# Patient Record
Sex: FEMALE | Race: WHITE | Employment: FULL TIME | URBAN - METROPOLITAN AREA
[De-identification: names, ages, dates, MRNs, and addresses within clinical notes are randomized per-mention and may not be internally consistent; named-entity substitution may affect disease eponyms.]

---

## 2017-04-27 ENCOUNTER — HOSPITAL ENCOUNTER (OUTPATIENT)
Dept: MAMMOGRAPHY | Age: 57
Discharge: HOME | End: 2017-04-27
Attending: OBSTETRICS & GYNECOLOGY
Payer: COMMERCIAL

## 2017-04-27 DIAGNOSIS — Z12.39 SCREENING FOR MALIGNANT NEOPLASM OF BREAST: ICD-10-CM

## 2017-04-27 PROCEDURE — 77067 SCR MAMMO BI INCL CAD: CPT

## 2018-06-19 ENCOUNTER — HOSPITAL ENCOUNTER (OUTPATIENT)
Dept: MAMMOGRAPHY | Age: 58
Discharge: HOME | End: 2018-06-19
Attending: OBSTETRICS & GYNECOLOGY
Payer: COMMERCIAL

## 2018-06-19 DIAGNOSIS — Z12.31 VISIT FOR SCREENING MAMMOGRAM: ICD-10-CM

## 2018-06-19 PROCEDURE — 77067 SCR MAMMO BI INCL CAD: CPT

## 2019-10-03 ENCOUNTER — HOSPITAL ENCOUNTER (OUTPATIENT)
Dept: MAMMOGRAPHY | Age: 59
Discharge: HOME | End: 2019-10-03
Attending: NURSE PRACTITIONER
Payer: COMMERCIAL

## 2019-10-03 DIAGNOSIS — N64.4 BREAST PAIN, RIGHT: ICD-10-CM

## 2019-10-03 DIAGNOSIS — R20.8 BURNING SENSATION: ICD-10-CM

## 2019-10-03 PROCEDURE — 77066 DX MAMMO INCL CAD BI: CPT

## 2019-10-03 PROCEDURE — 76642 ULTRASOUND BREAST LIMITED: CPT

## 2024-02-09 ENCOUNTER — EVALUATION (OUTPATIENT)
Age: 64
End: 2024-02-09

## 2024-02-09 ENCOUNTER — OFFICE VISIT (OUTPATIENT)
Dept: ORTHOPEDIC SURGERY | Age: 64
End: 2024-02-09
Payer: COMMERCIAL

## 2024-02-09 VITALS — HEIGHT: 66 IN | BODY MASS INDEX: 29.73 KG/M2 | WEIGHT: 185 LBS

## 2024-02-09 DIAGNOSIS — M25.311 DYSKINESIS OF RIGHT SCAPULA: Primary | ICD-10-CM

## 2024-02-09 DIAGNOSIS — G89.29 CHRONIC RIGHT-SIDED THORACIC BACK PAIN: Primary | ICD-10-CM

## 2024-02-09 DIAGNOSIS — M54.6 CHRONIC RIGHT-SIDED THORACIC BACK PAIN: Primary | ICD-10-CM

## 2024-02-09 DIAGNOSIS — M25.511 RIGHT SHOULDER PAIN, UNSPECIFIED CHRONICITY: ICD-10-CM

## 2024-02-09 PROCEDURE — 99203 OFFICE O/P NEW LOW 30 MIN: CPT | Performed by: PHYSICIAN ASSISTANT

## 2024-02-09 NOTE — PROGRESS NOTES
pain.  5 out of 5 external rotation strength. Negative Kramer and Neer signs today.  Negative Estill's.  Negative O'Sun's.  Sensation intact distally.  Distal radius pulse 2+.  Full elbow and wrist range of motion present without pain.  She is a limited in her back mobility and her scapular mobility today.      Imaging:   Shoulder XR: Grashey, Axillary and Scapula Yviews     Clinical Indication:  1. Dyskinesis of right scapula    2. Right shoulder pain, unspecified chronicity           Report: Grashey, Axillary and Scapula Y XRs of the Right shoulder demonstrates no acute fracture dislocation or advanced degenerative change    Impression: No acute findings as above        All imaging interpreted by myself Elizabeth Sparks PA-C independent of radiology review        Assessment:     ICD-10-CM    1. Dyskinesis of right scapula  M25.311       2. Right shoulder pain, unspecified chronicity  M25.511 XR SHOULDER RIGHT (MIN 2 VIEWS)     Ambulatory referral to Physical Therapy          Plan:     We discussed that I do believe most of her pain is coming from her scapula.  We discussed this called scapular dyskinesia and it does improve with good formal physical therapy.  Multiple muscles attached to her scapula which we discussed today and I think a lot of these muscles are locked down causing lack of range of motion and increased pain.   I had Shahbaz Guerra come talk to her in clinic today and he gave her a med bridge program to work on at home until Will get her in with our [facility] office for further formal therapy.  In the meantime she would like to see Megan Aguila with spine as well for evaluation of her cervical and thoracic spine.  I do not think this is her issue at all but she wanted to proceed with this referral in case the formal therapy did not help.  We discussed at length I do think formal physical therapy will help.  That although she has been to therapy before I do not trust all physical

## 2024-02-09 NOTE — PROGRESS NOTES
[facility]  [facility] [id number]  Dept: [phone]      Physical Therapy Consult     Referring MD: ADELE Pertty   Diagnosis:     ICD-10-CM    1. Chronic right-sided thoracic back pain  M54.6     G89.29          Surgery: No surgery found   DOS:  No surgery found     PERTINENT MEDICAL HISTORY     Past medical and surgical history:   Past Medical History:   Diagnosis Date    Abnormal Papanicolaou smear of cervix     HSV I    Arthritis     Dermatitis     Hypertension     IBS (irritable bowel syndrome)     Kidney damage     Menopause     Nausea & vomiting     Psychiatric disorder     depression and anxiety      Past Surgical History:   Procedure Laterality Date    COLONOSCOPY  08/01/2018    COLPOSCOPY      GYN      ovarian cyst removal    GYN      c- section x 3    HEENT      rhinoplasty    HEENT      sinus surgery; deviated septum    MASTOPEXY Bilateral     Time frame 6151-0579    ORTHOPEDIC SURGERY      hand surgery, foot surgery    OTHER SURGICAL HISTORY      bone tumor    TONSILLECTOMY      TOTAL KNEE ARTHROPLASTY Bilateral     TOTAL KNEE ARTHROPLASTY  6/2011    right     Medications: reviewed in chart   Allergies:   Allergies   Allergen Reactions    Latex Other (See Comments)    Morphine Itching        SUBJECTIVE     Chief complaints/history of injury: Patient is a 63 y.o. female with a PMH as noted above.  She presents to PT with c/o R sided thoracic pain which she indicates deep in the medial scapular musculature. She states she has been experiencing this for 3 years, with attempts at chiropractic care and physical therapy. She will have interim relief of symptoms, but they return relatively quickly.    Neuro screen: denies numbness, tingling, and radiating pain    OBJECTIVE       Palpation/Observation Hypomobile T3-8 (TTP R T4-6), hypertonic rhomboid (R)   ROM WFL except mod restriction in thoracic extension     Treatment performed  Manual therapy utilizing

## 2024-07-19 ENCOUNTER — TELEPHONE (OUTPATIENT)
Dept: ORTHOPEDIC SURGERY | Age: 64
End: 2024-07-19

## 2024-07-19 NOTE — TELEPHONE ENCOUNTER
Please review the records for her R foot, they can seem to get it healed they are doing a referral but havent received it yet

## 2024-08-05 ENCOUNTER — OFFICE VISIT (OUTPATIENT)
Dept: ORTHOPEDIC SURGERY | Age: 64
End: 2024-08-05
Payer: COMMERCIAL

## 2024-08-05 DIAGNOSIS — M76.821 TIBIALIS TENDINITIS OF RIGHT LOWER EXTREMITY: ICD-10-CM

## 2024-08-05 DIAGNOSIS — M25.371 RIGHT ANKLE INSTABILITY: Primary | ICD-10-CM

## 2024-08-05 PROCEDURE — 99204 OFFICE O/P NEW MOD 45 MIN: CPT | Performed by: ORTHOPAEDIC SURGERY

## 2024-08-05 NOTE — PROGRESS NOTES
Name: Mihaela Osman  YOB: 1960  Gender: female  MRN: [id number]    Summary:     Right chronic lateral ankle instability medial ankle instability, right stage II posterior tibial tendon sufficiency     CC: New Patient (Right ankle, mri disc uploading.)       HPI: Mihaela Osman is a 63 y.o. female who presents with New Patient (Right ankle, mri disc uploading.)  .  This patient presents the office today with a longstanding history of ankle sprains since the age of 6.  She also notes progressively worsening flatfoot and pain the medial aspect of the ankle.  She is tried physical therapy and bracing as well as a walker boot with ongoing persistent pain despite conservative measures.    History was obtained by Patient     ROS/Meds/PSH/PMH/FH/SH: I personally reviewed the patients standard intake form.  Below are the pertinents    Tobacco:  reports that she has never smoked. She has never used smokeless tobacco.  Diabetes: None      Physical Examination:    Exam of the right lower extremity shows severe planovalgus.  She has supple subtalar joint range of motion.  She has franci instability talar tilt testing and intra drawer testing without fullness over the peroneal tendons.  She has swelling and tenderness palpation of the posterior tibial tendon.  She has palpable pulses and intact sensation.    Imaging:   I independently interpreted XR ordered by a different physician, taken from an outside facility and I reviewed imaging performed by another physician in my group.  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

## 2024-08-06 DIAGNOSIS — M76.821 TIBIALIS TENDINITIS OF RIGHT LOWER EXTREMITY: Primary | ICD-10-CM

## 2024-08-06 DIAGNOSIS — M25.371 RIGHT ANKLE INSTABILITY: ICD-10-CM

## 2024-08-08 RX ORDER — PREGABALIN 50 MG/1
100 CAPSULE ORAL
COMMUNITY
Start: 2024-05-10

## 2024-08-08 RX ORDER — AMOXICILLIN 500 MG/1
500 CAPSULE ORAL PRN
COMMUNITY

## 2024-08-08 RX ORDER — TRAMADOL HYDROCHLORIDE 50 MG/1
50 TABLET ORAL DAILY PRN
Status: ON HOLD | COMMUNITY
End: 2024-08-15 | Stop reason: HOSPADM

## 2024-08-08 NOTE — PERIOP NOTE
Patient verified name and .  Order for consent NOT found in EHR at time of PAT visit. Unable to verify case posting against order; surgery verified by patient.    Type 1B surgery, PAT phone assessment complete.  Orders not received.  Labs per surgeon: unknown; no orders received    Labs per anesthesia protocol: none indicated    Patient answered medical/surgical history questions at their best of ability. All prior to admission medications documented in EPIC.    Patient instructed to continue taking all prescription medications up to the day of surgery but to take only the following medications the day of surgery according to anesthesia guidelines with a small sip of water: amoxicillin (if indicated), Wellbutrin, hydroxyzine (if needed), Lyrica (if needed), tramadol (if needed)     Patient informed that all vitamins and supplements should be held 7 days prior to surgery and NSAIDS 5 days prior to surgery. Prescription meds to hold:none. Do not take lisinopril on the morning of procedure.    Patient instructed on the following:    > Arrive at [facility]PC Entrance, time of arrival to be called the day before by 1700  > NPO after midnight, unless otherwise indicated, including gum, mints, and ice chips  > Responsible adult must drive patient to the hospital, stay during surgery, and patient will need supervision 24 hours after anesthesia  > Use non moisturizing soap in shower the night before surgery and on the morning of surgery  > All piercings must be removed prior to arrival.    > Leave all valuables (money and jewelry) at home but bring insurance card and ID on DOS.   > You may be required to pay a deductible or co-pay on the day of your procedure. You can pre-pay by calling [phone] if your surgery is at the [facility] or [phone] if your surgery is at the [facility].  > Do not wear make-up, nail polish, lotions, cologne, perfumes, powders, or oil on skin. Artificial nails are not permitted.

## 2024-08-14 NOTE — PERIOP NOTE
Preop department called to notify patient of arrival time for scheduled procedure. Instructions given to   - Arrive at OPC Entrance [address].  - Remain NPO after midnight, unless otherwise indicated, including gum, mints, and ice chips.   - Have a responsible adult to drive patient to the hospital, stay during surgery, and patient will need supervision 24 hours after anesthesia.   - Use antibacterial soap in shower the night before surgery and on the morning of surgery.       Was patient contacted: pt  Voicemail left:   Numbers contacted: [phone]   Arrival time: 1230

## 2024-08-15 ENCOUNTER — ANESTHESIA EVENT (OUTPATIENT)
Dept: SURGERY | Age: 64
End: 2024-08-15
Payer: COMMERCIAL

## 2024-08-15 ENCOUNTER — APPOINTMENT (OUTPATIENT)
Dept: GENERAL RADIOLOGY | Age: 64
End: 2024-08-15
Attending: ORTHOPAEDIC SURGERY
Payer: COMMERCIAL

## 2024-08-15 ENCOUNTER — HOSPITAL ENCOUNTER (OUTPATIENT)
Age: 64
Setting detail: OUTPATIENT SURGERY
Discharge: HOME | End: 2024-08-15
Attending: ORTHOPAEDIC SURGERY | Admitting: ORTHOPAEDIC SURGERY
Payer: COMMERCIAL

## 2024-08-15 ENCOUNTER — ANESTHESIA (OUTPATIENT)
Dept: SURGERY | Age: 64
End: 2024-08-15
Payer: COMMERCIAL

## 2024-08-15 VITALS
HEIGHT: 66 IN | SYSTOLIC BLOOD PRESSURE: 97 MMHG | DIASTOLIC BLOOD PRESSURE: 59 MMHG | TEMPERATURE: 98.5 F | OXYGEN SATURATION: 94 % | RESPIRATION RATE: 20 BRPM | WEIGHT: 185 LBS | BODY MASS INDEX: 29.73 KG/M2 | HEART RATE: 87 BPM

## 2024-08-15 DIAGNOSIS — G89.18 ACUTE POSTOPERATIVE PAIN: Primary | ICD-10-CM

## 2024-08-15 PROCEDURE — 6360000002 HC RX W HCPCS: Performed by: ANESTHESIOLOGY

## 2024-08-15 PROCEDURE — 7100000010 HC PHASE II RECOVERY - FIRST 15 MIN: Performed by: ORTHOPAEDIC SURGERY

## 2024-08-15 PROCEDURE — 3700000001 HC ADD 15 MINUTES (ANESTHESIA): Performed by: ORTHOPAEDIC SURGERY

## 2024-08-15 PROCEDURE — 2580000003 HC RX 258: Performed by: ANESTHESIOLOGY

## 2024-08-15 PROCEDURE — 2500000003 HC RX 250 WO HCPCS

## 2024-08-15 PROCEDURE — C1769 GUIDE WIRE: HCPCS | Performed by: ORTHOPAEDIC SURGERY

## 2024-08-15 PROCEDURE — 6370000000 HC RX 637 (ALT 250 FOR IP): Performed by: ANESTHESIOLOGY

## 2024-08-15 PROCEDURE — C1713 ANCHOR/SCREW BN/BN,TIS/BN: HCPCS | Performed by: ORTHOPAEDIC SURGERY

## 2024-08-15 PROCEDURE — 2720000010 HC SURG SUPPLY STERILE: Performed by: ORTHOPAEDIC SURGERY

## 2024-08-15 PROCEDURE — 3700000000 HC ANESTHESIA ATTENDED CARE: Performed by: ORTHOPAEDIC SURGERY

## 2024-08-15 PROCEDURE — 6360000002 HC RX W HCPCS

## 2024-08-15 PROCEDURE — 7100000001 HC PACU RECOVERY - ADDTL 15 MIN: Performed by: ORTHOPAEDIC SURGERY

## 2024-08-15 PROCEDURE — 3600000004 HC SURGERY LEVEL 4 BASE: Performed by: ORTHOPAEDIC SURGERY

## 2024-08-15 PROCEDURE — 2580000003 HC RX 258

## 2024-08-15 PROCEDURE — 3600000014 HC SURGERY LEVEL 4 ADDTL 15MIN: Performed by: ORTHOPAEDIC SURGERY

## 2024-08-15 PROCEDURE — 64447 NJX AA&/STRD FEMORAL NRV IMG: CPT | Performed by: ANESTHESIOLOGY

## 2024-08-15 PROCEDURE — 7100000000 HC PACU RECOVERY - FIRST 15 MIN: Performed by: ORTHOPAEDIC SURGERY

## 2024-08-15 PROCEDURE — 6360000002 HC RX W HCPCS: Performed by: NURSE PRACTITIONER

## 2024-08-15 PROCEDURE — 64445 NJX AA&/STRD SCIATIC NRV IMG: CPT | Performed by: ANESTHESIOLOGY

## 2024-08-15 PROCEDURE — 2709999900 HC NON-CHARGEABLE SUPPLY: Performed by: ORTHOPAEDIC SURGERY

## 2024-08-15 DEVICE — SCREW BONE L50MM DIA6.7MM THRD L18MM FT ANK TI ST SELF DRL: Type: IMPLANTABLE DEVICE | Site: HEEL | Status: FUNCTIONAL

## 2024-08-15 DEVICE — BIOSYNC ANATOMIC COTTON WEDGE, 20X7.5 MM
Type: IMPLANTABLE DEVICE | Site: FOOT | Status: FUNCTIONAL
Brand: ARTHREX®

## 2024-08-15 DEVICE — TENO SCRW,BIO-COMP
Type: IMPLANTABLE DEVICE | Site: HEEL | Status: FUNCTIONAL
Brand: ARTHREX®

## 2024-08-15 RX ORDER — SODIUM CHLORIDE, SODIUM LACTATE, POTASSIUM CHLORIDE, CALCIUM CHLORIDE 600; 310; 30; 20 MG/100ML; MG/100ML; MG/100ML; MG/100ML
INJECTION, SOLUTION INTRAVENOUS CONTINUOUS
Status: DISCONTINUED | OUTPATIENT
Start: 2024-08-15 | End: 2024-08-15 | Stop reason: HOSPADM

## 2024-08-15 RX ORDER — DEXAMETHASONE SODIUM PHOSPHATE 10 MG/ML
INJECTION, SOLUTION INTRAMUSCULAR; INTRAVENOUS
Status: COMPLETED | OUTPATIENT
Start: 2024-08-15 | End: 2024-08-15

## 2024-08-15 RX ORDER — PROPOFOL 10 MG/ML
INJECTION, EMULSION INTRAVENOUS PRN
Status: DISCONTINUED | OUTPATIENT
Start: 2024-08-15 | End: 2024-08-15 | Stop reason: SDUPTHER

## 2024-08-15 RX ORDER — SODIUM CHLORIDE 0.9 % (FLUSH) 0.9 %
5-40 SYRINGE (ML) INJECTION PRN
Status: DISCONTINUED | OUTPATIENT
Start: 2024-08-15 | End: 2024-08-15 | Stop reason: HOSPADM

## 2024-08-15 RX ORDER — IPRATROPIUM BROMIDE AND ALBUTEROL SULFATE 2.5; .5 MG/3ML; MG/3ML
1 SOLUTION RESPIRATORY (INHALATION)
Status: DISCONTINUED | OUTPATIENT
Start: 2024-08-15 | End: 2024-08-15 | Stop reason: HOSPADM

## 2024-08-15 RX ORDER — ACETAMINOPHEN 500 MG
1000 TABLET ORAL ONCE
Status: COMPLETED | OUTPATIENT
Start: 2024-08-15 | End: 2024-08-15

## 2024-08-15 RX ORDER — SODIUM CHLORIDE 0.9 % (FLUSH) 0.9 %
5-40 SYRINGE (ML) INJECTION EVERY 12 HOURS SCHEDULED
Status: DISCONTINUED | OUTPATIENT
Start: 2024-08-15 | End: 2024-08-15 | Stop reason: HOSPADM

## 2024-08-15 RX ORDER — ASPIRIN 81 MG/1
81 TABLET ORAL 2 TIMES DAILY
Qty: 60 TABLET | Refills: 0 | Status: SHIPPED | OUTPATIENT
Start: 2024-08-15

## 2024-08-15 RX ORDER — MIDAZOLAM HYDROCHLORIDE 2 MG/2ML
2 INJECTION, SOLUTION INTRAMUSCULAR; INTRAVENOUS
Status: COMPLETED | OUTPATIENT
Start: 2024-08-15 | End: 2024-08-15

## 2024-08-15 RX ORDER — EPHEDRINE SULFATE 5 MG/ML
INJECTION INTRAVENOUS PRN
Status: DISCONTINUED | OUTPATIENT
Start: 2024-08-15 | End: 2024-08-15 | Stop reason: SDUPTHER

## 2024-08-15 RX ORDER — SODIUM CHLORIDE 9 MG/ML
INJECTION, SOLUTION INTRAVENOUS PRN
Status: DISCONTINUED | OUTPATIENT
Start: 2024-08-15 | End: 2024-08-15 | Stop reason: HOSPADM

## 2024-08-15 RX ORDER — NALOXONE HYDROCHLORIDE 0.4 MG/ML
INJECTION, SOLUTION INTRAMUSCULAR; INTRAVENOUS; SUBCUTANEOUS PRN
Status: DISCONTINUED | OUTPATIENT
Start: 2024-08-15 | End: 2024-08-15 | Stop reason: HOSPADM

## 2024-08-15 RX ORDER — OXYCODONE HYDROCHLORIDE 5 MG/1
5 TABLET ORAL
Status: DISCONTINUED | OUTPATIENT
Start: 2024-08-15 | End: 2024-08-15 | Stop reason: HOSPADM

## 2024-08-15 RX ORDER — OXYCODONE HYDROCHLORIDE 5 MG/1
5 TABLET ORAL EVERY 6 HOURS PRN
Qty: 20 TABLET | Refills: 0 | Status: SHIPPED | OUTPATIENT
Start: 2024-08-15 | End: 2024-08-20

## 2024-08-15 RX ORDER — FENTANYL CITRATE 50 UG/ML
100 INJECTION, SOLUTION INTRAMUSCULAR; INTRAVENOUS
Status: COMPLETED | OUTPATIENT
Start: 2024-08-15 | End: 2024-08-15

## 2024-08-15 RX ORDER — CEPHALEXIN 500 MG/1
500 CAPSULE ORAL 4 TIMES DAILY
Qty: 12 CAPSULE | Refills: 0 | Status: SHIPPED | OUTPATIENT
Start: 2024-08-15

## 2024-08-15 RX ORDER — HALOPERIDOL 5 MG/ML
1 INJECTION INTRAMUSCULAR
Status: DISCONTINUED | OUTPATIENT
Start: 2024-08-15 | End: 2024-08-15 | Stop reason: HOSPADM

## 2024-08-15 RX ORDER — LIDOCAINE HYDROCHLORIDE 20 MG/ML
INJECTION, SOLUTION EPIDURAL; INFILTRATION; INTRACAUDAL; PERINEURAL PRN
Status: DISCONTINUED | OUTPATIENT
Start: 2024-08-15 | End: 2024-08-15 | Stop reason: SDUPTHER

## 2024-08-15 RX ORDER — LIDOCAINE HYDROCHLORIDE 10 MG/ML
1 INJECTION, SOLUTION INFILTRATION; PERINEURAL
Status: DISCONTINUED | OUTPATIENT
Start: 2024-08-15 | End: 2024-08-15 | Stop reason: HOSPADM

## 2024-08-15 RX ORDER — PROCHLORPERAZINE EDISYLATE 5 MG/ML
5 INJECTION INTRAMUSCULAR; INTRAVENOUS
Status: DISCONTINUED | OUTPATIENT
Start: 2024-08-15 | End: 2024-08-15 | Stop reason: HOSPADM

## 2024-08-15 RX ADMIN — SODIUM CHLORIDE, SODIUM LACTATE, POTASSIUM CHLORIDE, AND CALCIUM CHLORIDE: 600; 310; 30; 20 INJECTION, SOLUTION INTRAVENOUS at 14:57

## 2024-08-15 RX ADMIN — ROPIVACAINE HYDROCHLORIDE 15 ML: 5 INJECTION, SOLUTION EPIDURAL; INFILTRATION; PERINEURAL at 12:49

## 2024-08-15 RX ADMIN — PHENYLEPHRINE HYDROCHLORIDE 200 MCG: 10 INJECTION INTRAVENOUS at 14:24

## 2024-08-15 RX ADMIN — PHENYLEPHRINE HYDROCHLORIDE 200 MCG: 10 INJECTION INTRAVENOUS at 14:44

## 2024-08-15 RX ADMIN — PHENYLEPHRINE HYDROCHLORIDE 100 MCG: 10 INJECTION INTRAVENOUS at 14:53

## 2024-08-15 RX ADMIN — DEXAMETHASONE SODIUM PHOSPHATE 2 MG: 10 INJECTION, SOLUTION INTRAMUSCULAR; INTRAVENOUS at 12:49

## 2024-08-15 RX ADMIN — PHENYLEPHRINE HYDROCHLORIDE 200 MCG: 10 INJECTION INTRAVENOUS at 15:02

## 2024-08-15 RX ADMIN — DEXAMETHASONE SODIUM PHOSPHATE 4 MG: 10 INJECTION, SOLUTION INTRAMUSCULAR; INTRAVENOUS at 12:45

## 2024-08-15 RX ADMIN — MIDAZOLAM 2 MG: 1 INJECTION INTRAMUSCULAR; INTRAVENOUS at 12:45

## 2024-08-15 RX ADMIN — PHENYLEPHRINE HYDROCHLORIDE 200 MCG: 10 INJECTION INTRAVENOUS at 14:08

## 2024-08-15 RX ADMIN — ROPIVACAINE HYDROCHLORIDE 30 ML: 5 INJECTION, SOLUTION EPIDURAL; INFILTRATION; PERINEURAL at 12:45

## 2024-08-15 RX ADMIN — LIDOCAINE HYDROCHLORIDE 80 MG: 20 INJECTION, SOLUTION EPIDURAL; INFILTRATION; INTRACAUDAL; PERINEURAL at 13:38

## 2024-08-15 RX ADMIN — PHENYLEPHRINE HYDROCHLORIDE 200 MCG: 10 INJECTION INTRAVENOUS at 14:17

## 2024-08-15 RX ADMIN — Medication 2000 MG: at 13:45

## 2024-08-15 RX ADMIN — FENTANYL CITRATE 50 MCG: 50 INJECTION INTRAMUSCULAR; INTRAVENOUS at 12:45

## 2024-08-15 RX ADMIN — PROPOFOL 60 MG: 10 INJECTION, EMULSION INTRAVENOUS at 13:38

## 2024-08-15 RX ADMIN — ACETAMINOPHEN 1000 MG: 500 TABLET, FILM COATED ORAL at 12:24

## 2024-08-15 RX ADMIN — EPHEDRINE SULFATE 5 MG: 5 INJECTION INTRAVENOUS at 14:53

## 2024-08-15 RX ADMIN — PHENYLEPHRINE HYDROCHLORIDE 200 MCG: 10 INJECTION INTRAVENOUS at 13:54

## 2024-08-15 RX ADMIN — EPHEDRINE SULFATE 10 MG: 5 INJECTION INTRAVENOUS at 14:30

## 2024-08-15 RX ADMIN — PHENYLEPHRINE HYDROCHLORIDE 200 MCG: 10 INJECTION INTRAVENOUS at 14:02

## 2024-08-15 RX ADMIN — PHENYLEPHRINE HYDROCHLORIDE 200 MCG: 10 INJECTION INTRAVENOUS at 14:12

## 2024-08-15 RX ADMIN — PROPOFOL 200 MCG/KG/MIN: 10 INJECTION, EMULSION INTRAVENOUS at 13:39

## 2024-08-15 RX ADMIN — EPHEDRINE SULFATE 10 MG: 5 INJECTION INTRAVENOUS at 14:36

## 2024-08-15 RX ADMIN — SODIUM CHLORIDE, SODIUM LACTATE, POTASSIUM CHLORIDE, AND CALCIUM CHLORIDE: 600; 310; 30; 20 INJECTION, SOLUTION INTRAVENOUS at 12:29

## 2024-08-15 ASSESSMENT — PAIN SCALES - GENERAL
PAINLEVEL_OUTOF10: 0
PAINLEVEL_OUTOF10: 6
PAINLEVEL_OUTOF10: 0
PAINLEVEL_OUTOF10: 0

## 2024-08-15 NOTE — ANESTHESIA PROCEDURE NOTES
Peripheral Block    Patient location during procedure: pre-op  Reason for block: post-op pain management and at surgeon's request  Start time: 8/15/2024 12:45 PM  End time: 8/15/2024 12:48 PM  Staffing  Performed: anesthesiologist   Anesthesiologist: Ernie Polk MD  Performed by: Ernie Polk MD  Authorized by: Ernie Polk MD    Preanesthetic Checklist  Completed: patient identified, IV checked, site marked, risks and benefits discussed, surgical/procedural consents, equipment checked, pre-op evaluation, timeout performed, anesthesia consent given, oxygen available and monitors applied/VS acknowledged  Peripheral Block   Prep: ChloraPrep  Provider prep: mask  Patient monitoring: cardiac monitor, continuous pulse ox, frequent blood pressure checks, IV access and oxygen  Block type: Sciatic  Popliteal  Laterality: right  Injection technique: single-shot  Guidance: ultrasound guided    Needle   Needle type: insulated echogenic nerve stimulator needle   Needle gauge: 21 G  Needle localization: ultrasound guidance  Needle length: 10 cm  Assessment   Injection assessment: negative aspiration for heme, no paresthesia on injection, local visualized surrounding nerve on ultrasound and no intravascular symptoms  Paresthesia pain: none  Slow fractionated injection: yes  Hemodynamics: stable  Outcomes: uncomplicated and patient tolerated procedure well    Additional Notes  -Block placed for post op pain at surgeon's request.     -Ultrasound used to identify anatomy of nerve bundle.    -Needle placement and local injection at perineural area confirmed with real time ultrasound guidance.     -Local visualized with ultrasound surrounding nerve.    -Permanent Image taken and placed on chart.      Medications Administered  dexAMETHasone (DECADRON) (PF) 10 mg/mL injection - Other   4 mg - 8/15/2024 12:45:00 PM  ROPivacaine 0.5% with EPINEPHrine 1:097511 injection (ANESTHESIA USE ONLY) (Mixture components: EPINEPHrine PF 1

## 2024-08-15 NOTE — ANESTHESIA POSTPROCEDURE EVALUATION
Department of Anesthesiology  Postprocedure Note    Patient: Mihalea Osman  MRN: [id number]  YOB: 1960  Date of evaluation: 8/15/2024    Procedure Summary       Date: 08/15/24 Room / Location: [facility] OP OR 02 / [facility]    Anesthesia Start: 1330 Anesthesia Stop: 1510    Procedures:       Right spring ligament reconstruction (Right: Ankle)      Right posterior tibial tendon reconstruction (Right: Ankle)      Right possible flexor digitorum longus tendon transfer (Right: Ankle)      Right medial calcaneal slide osteotomy (Right: Ankle)      Right gastrocnemius recession (Right: Ankle)      Right possible cotton osteotomy (Right: Foot)      Right ankle arthroscopyand lateral ankle ligament reconsruction (Right: Ankle) Diagnosis:       Right ankle instability      Tibialis tendinitis of right lower extremity      (Right ankle instability [M25.371])      (Tibialis tendinitis of right lower extremity [M76.821])    Surgeons: Bakari Jesus III, MD Responsible Provider: Ernie Polk MD    Anesthesia Type: TIVA ASA Status: 2            Anesthesia Type: TIVA    Queenie Phase I: Queenie Score: 10    Queenie Phase II:      Anesthesia Post Evaluation    Patient location during evaluation: PACU  Patient participation: complete - patient participated  Level of consciousness: awake  Airway patency: patent  Nausea & Vomiting: no nausea  Cardiovascular status: hemodynamically stable  Respiratory status: acceptable, nonlabored ventilation and spontaneous ventilation  Hydration status: stable  Multimodal analgesia pain management approach    No notable events documented.

## 2024-08-15 NOTE — ANESTHESIA PROCEDURE NOTES
Peripheral Block    Patient location during procedure: pre-op  Reason for block: post-op pain management and at surgeon's request  Start time: 8/15/2024 12:49 PM  End time: 8/15/2024 12:50 PM  Staffing  Performed: anesthesiologist   Anesthesiologist: Ernie Polk MD  Performed by: Ernie Polk MD  Authorized by: Ernie Polk MD    Preanesthetic Checklist  Completed: patient identified, IV checked, site marked, risks and benefits discussed, surgical/procedural consents, equipment checked, pre-op evaluation, timeout performed, anesthesia consent given, oxygen available and monitors applied/VS acknowledged  Peripheral Block   Patient position: supine  Prep: ChloraPrep  Provider prep: mask  Patient monitoring: cardiac monitor, continuous pulse ox, frequent blood pressure checks, IV access and oxygen  Block type: Femoral  Adductor canal  Laterality: right  Injection technique: single-shot  Guidance: ultrasound guided    Needle   Needle type: insulated echogenic nerve stimulator needle   Needle gauge: 21 G  Needle localization: ultrasound guidance  Needle length: 10 cm  Assessment   Injection assessment: negative aspiration for heme, no paresthesia on injection, local visualized surrounding nerve on ultrasound and no intravascular symptoms  Paresthesia pain: none  Slow fractionated injection: yes  Hemodynamics: stable  Outcomes: patient tolerated procedure well and uncomplicated    Additional Notes  -Block placed for post op pain at surgeon's request.     -Ultrasound used to identify anatomy of nerve bundle.    -Needle placement and local injection at perineural area confirmed with real time ultrasound guidance.     -Local visualized with ultrasound surrounding nerve.    -Permanent Image taken and placed on chart.      Medications Administered  dexAMETHasone (DECADRON) (PF) 10 mg/mL injection - Other   2 mg - 8/15/2024 12:49:00 PM  ROPivacaine 0.5% with EPINEPHrine 1:561849 injection (ANESTHESIA USE ONLY)

## 2024-08-15 NOTE — H&P
Outpatient Surgery History and Physical:  Mihaela Osman was seen and examined.    CHIEF COMPLAINT:   right foot.     PE:   Ht 1.676 m (5' 6\")   Wt 83.9 kg (185 lb)   BMI 29.86 kg/m²     Heart:   Regular rhythm      Lungs:  Are clear      Past Medical History:    Past Medical History:   Diagnosis Date    Abnormal Papanicolaou smear of cervix     HSV I    Arthritis     Chronic kidney disease     stage 3a- followed by Dr. Victor at [facility]- last creatinine 1.05 8/2/24    Dermagraphy     Dermatitis     Hypertension     managed with medication    IBS (irritable bowel syndrome)     Kidney damage     Menopause     Nausea & vomiting     Psychiatric disorder     depression and anxiety       Surgical History:   Past Surgical History:   Procedure Laterality Date    COLONOSCOPY  08/01/2018    COLPOSCOPY      GYN      ovarian cyst removal    GYN      c- section x 3    HEENT      rhinoplasty    HEENT      sinus surgery; deviated septum    MASTOPEXY Bilateral     Time frame [id number]    ORTHOPEDIC SURGERY      hand surgery, foot surgery    OTHER SURGICAL HISTORY      bone tumor    TONSILLECTOMY      TOTAL KNEE ARTHROPLASTY Bilateral     TOTAL KNEE ARTHROPLASTY  6/2011    right       Social History: Patient  reports that she has never smoked. She has never used smokeless tobacco. She reports that she does not drink alcohol and does not use drugs.    Family History:   Family History   Problem Relation Age of Onset    Hypertension Father     Other Neg Hx     Post-op Cognitive Dysfunction Neg Hx     Emergence Delirium Neg Hx     Post-op Nausea/Vomiting Neg Hx     Heart Disease Father     Pseudochol. Deficiency Neg Hx     Malig Hypertherm Neg Hx     Breast Cancer Sister 50    Heart Disease Mother     Cancer Father     Delayed Awakening Neg Hx        Allergies: Reviewed per EMR  Latex, Adhesive tape, Hydromorphone, Morphine, Other, and Tetracyclines & related    Medications:    Prior to Admission

## 2024-08-15 NOTE — ANESTHESIA PRE PROCEDURE
Department of Anesthesiology  Preprocedure Note       Name:  Mihaela Osman   Age:  63 y.o.  :  1960                                          MRN:  [id number]         Date:  8/15/2024      Surgeon: Surgeon(s):  Bakari Jesus III, MD    Procedure: Procedure(s):  Right spring ligament reconstruction  Right posterior tibial tendon reconstruction  Right possible flexor digitorum longus tendon transfer  Right medial calcaneal slide osteotomy  Right gastrocnemius recession  Right possible cotton osteotomy  Right ankle arthroscopyand lateral ankle ligament reconsruction    Medications prior to admission:   Prior to Admission medications    Medication Sig Start Date End Date Taking? Authorizing Provider   traMADol (ULTRAM) 50 MG tablet Take 1 tablet by mouth daily as needed for Pain.   Yes Provider, Shaun, MD   amoxicillin (AMOXIL) 500 MG capsule Take 1 capsule by mouth as needed   Yes Provider, Shaun, MD   pregabalin (LYRICA) 50 MG capsule 2 capsules. 5/10/24  Yes Provider, Shaun, MD   buPROPion (WELLBUTRIN) 75 MG tablet Take by mouth daily   Yes Automatic Reconciliation, Ar   hydrOXYzine (ATARAX) 25 MG tablet Take 1 tablet by mouth 2 times daily as needed 11  Yes Automatic Reconciliation, Ar   linaclotide (LINZESS) 290 MCG CAPS capsule Take by mouth every morning (before breakfast)   Yes Automatic Reconciliation, Ar   lisinopril (PRINIVIL;ZESTRIL) 30 MG tablet Take 1 tablet by mouth daily   Yes Automatic Reconciliation, Ar       Current medications:    Current Facility-Administered Medications   Medication Dose Route Frequency Provider Last Rate Last Admin   • ceFAZolin (ANCEF) 2000 mg in sterile water 20 mL IV syringe  2,000 mg IntraVENous On Call to OR Danni Russell APRN - CNP       • lactated ringers IV soln infusion   IntraVENous Continuous Danni Russell APRN - CNP       • sodium chloride flush 0.9 % injection 5-40 mL  5-40 mL IntraVENous 2 times per day Lew

## 2024-08-15 NOTE — OP NOTE
calcaneal osteotomy was performed the tuberosity fragment shifted 1 cm medially and secured using Arthrex screw under fluoroscopic guidance.  This wound was irrigated and closed using Monocryl nylon sutures.  A medial approach the midfoot was opened at that time.  The posterior tibial tendon sheath was then entered.  There was significant degenerative changes the posterior tibial tendon at the insertion of the navicular.  The posterior tibial tendon was then debrided off the navicular at that time.  The FDL tendon was identified and harvested near the knot of Keaton.  The FDL transfer was brought  12 hole in the navicular and secured using interference grade.  Side-to-side tenodesis with the posterior tibial tendon stump was performed with nonabsorbable sutures.  The spring ligament and medial deltoid ligament complex was imbricated using FiberWire suture in a pants over vest fashion.  This wound was irrigated and closed using Monocryl nylon sutures.  The patient had residual fixed forefoot varus.  A separate dorsal approach the medial cuneiform was opened at that time.  Opening wedge osteotomy the medial cuneiform was an open and secured using a biasing wedge.  This wound was irrigated and closed using Monocryl nylon sutures.  Sterile dressings and applied followed by well-padded posterior splint.  Anesthesia was discontinued.  The patient was transferred back to recovery bed.  She was taken recovery in satisfactory condition.  She appeared to tolerate the procedure well.  There were no apparent surgical or anesthetic complications.  All needle and sponge counts were correct.      A sterile dressing was then applied to the leg and Posterior slab splint.  They were awoken from anesthesia and returned to the PACU without difficulty.    Post Operative Plan:   1- WB status: Non-Weight Bearing   2- Immobilization/assistive devices: crutches  3- DVT px: ASA 81 mg BID

## 2024-08-27 DIAGNOSIS — G89.18 ACUTE POST-OPERATIVE PAIN: Primary | ICD-10-CM

## 2024-08-27 RX ORDER — OXYCODONE HYDROCHLORIDE 5 MG/1
5 TABLET ORAL EVERY 6 HOURS PRN
Qty: 20 TABLET | Refills: 0 | Status: SHIPPED | OUTPATIENT
Start: 2024-08-27 | End: 2024-09-01

## 2024-08-30 ENCOUNTER — OFFICE VISIT (OUTPATIENT)
Dept: ORTHOPEDIC SURGERY | Age: 64
End: 2024-08-30
Payer: COMMERCIAL

## 2024-08-30 DIAGNOSIS — M25.371 RIGHT ANKLE INSTABILITY: ICD-10-CM

## 2024-08-30 DIAGNOSIS — M76.821 TIBIALIS TENDINITIS OF RIGHT LOWER EXTREMITY: Primary | ICD-10-CM

## 2024-08-30 PROCEDURE — 99024 POSTOP FOLLOW-UP VISIT: CPT | Performed by: NURSE PRACTITIONER

## 2024-08-30 PROCEDURE — 29405 APPL SHORT LEG CAST: CPT | Performed by: NURSE PRACTITIONER

## 2024-08-30 NOTE — PROGRESS NOTES
Name: Mihaela Osman  YOB: 1960  Gender: female  MRN: [id number]    Procedure Performed:  Right ankle arthroscopy with debridement of lateral talar osteochondral defect with microfracture 4 x 4 mm  Right lateral ankle ligament reconstruction, Broström  Right gastrocnemius recession  Right medial displacement calcaneal osteotomy  Right posterior tibial tendon debridement with flexor digitorum longus tendon transfer  Right spring ligament reconstruction of the ankle  Right medial cuneiform cotton opening wedge osteotomy        Date of Procedure: 08/15/2024      Subjective: Patient reports that she is surviving this recovery.  She notes that the pain was pretty unbearable after the anesthetic block wore off at about hour 36 after surgery.  She has been taking the pain medicine as she needs it.  She notes also that she has had some nerve type sensations which she describes as electrical shocks throughout the foot.      Physical Examination: The incisional areas look okay today and do appear to be healing well without signs of infection at this time.  There is expected discoloration to the affected extremity.  She has normal capillary refill.  She has no signs of DVT at this time, denies any calf or behind the knee pain and does present with a negative Homans' sign.  She can wiggle her toes effectively without pain.  Swelling is pretty significant throughout the dorsal foot and lateral ankle.        Imaging:   No imaging reviewed          Assessment:   Status post right flatfoot reconstruction, with ankle arthroscopy and Broström procedure.  We did discuss progression care today as well as expectations until the next follow-up visit.  Question concerns were addressed, she verbalized understanding of today's conversation.      Plan:   3 This is stable chronic illness/condition  Treatment at this time: Sutures removed, Steri-Strips and short legged cast was placed today.  Patient will continue

## 2024-09-03 ENCOUNTER — TELEPHONE (OUTPATIENT)
Dept: ORTHOPEDIC SURGERY | Age: 64
End: 2024-09-03

## 2024-09-06 ENCOUNTER — TELEPHONE (OUTPATIENT)
Dept: ORTHOPEDIC SURGERY | Age: 64
End: 2024-09-06

## 2024-09-06 RX ORDER — SALICYLIC ACID 40 %
ADHESIVE PATCH, MEDICATED TOPICAL
Qty: 180 TABLET | Refills: 1 | OUTPATIENT
Start: 2024-09-06

## 2024-09-06 NOTE — TELEPHONE ENCOUNTER
Spoke with patient and advised her that it is normal to still have a lot of swelling. She should continue to elevate above heart level as much as possible.

## 2024-09-06 NOTE — TELEPHONE ENCOUNTER
She is asking for a call from Margaret. She is three weeks post op and still has a lot of swelling. Please give her a call.

## 2024-09-20 ENCOUNTER — OFFICE VISIT (OUTPATIENT)
Dept: ORTHOPEDIC SURGERY | Age: 64
End: 2024-09-20

## 2024-09-20 DIAGNOSIS — M25.371 RIGHT ANKLE INSTABILITY: Primary | ICD-10-CM

## 2024-09-20 DIAGNOSIS — G89.18 ACUTE POSTOPERATIVE PAIN: ICD-10-CM

## 2024-09-20 DIAGNOSIS — M76.821 TIBIALIS TENDINITIS OF RIGHT LOWER EXTREMITY: ICD-10-CM

## 2024-09-20 PROCEDURE — M5002 MISC BOOT SOCK: HCPCS | Performed by: NURSE PRACTITIONER

## 2024-09-20 PROCEDURE — 99024 POSTOP FOLLOW-UP VISIT: CPT | Performed by: NURSE PRACTITIONER

## 2024-09-20 PROCEDURE — M5017 MISC EVENUP: HCPCS | Performed by: NURSE PRACTITIONER

## 2024-09-20 RX ORDER — OXYCODONE HYDROCHLORIDE 5 MG/1
5 TABLET ORAL EVERY 6 HOURS PRN
Qty: 20 TABLET | Refills: 0 | Status: SHIPPED | OUTPATIENT
Start: 2024-09-20 | End: 2024-09-25

## 2024-09-25 RX ORDER — SALICYLIC ACID 40 %
ADHESIVE PATCH, MEDICATED TOPICAL
Qty: 60 TABLET | Refills: 0 | OUTPATIENT
Start: 2024-09-25

## 2024-10-25 ENCOUNTER — OFFICE VISIT (OUTPATIENT)
Dept: ORTHOPEDIC SURGERY | Age: 64
End: 2024-10-25

## 2024-10-25 DIAGNOSIS — M76.821 TIBIALIS TENDINITIS OF RIGHT LOWER EXTREMITY: ICD-10-CM

## 2024-10-25 DIAGNOSIS — M25.371 RIGHT ANKLE INSTABILITY: Primary | ICD-10-CM

## 2024-10-25 PROCEDURE — 99024 POSTOP FOLLOW-UP VISIT: CPT | Performed by: NURSE PRACTITIONER

## 2024-10-25 NOTE — PROGRESS NOTES
Name: Mihaela Osman  YOB: 1960  Gender: female  MRN: [id number]    Procedure Performed:  Right ankle arthroscopy with debridement of lateral talar osteochondral defect with microfracture 4 x 4 mm  Right lateral ankle ligament reconstruction, Broström  Right gastrocnemius recession  Right medial displacement calcaneal osteotomy  Right posterior tibial tendon debridement with flexor digitorum longus tendon transfer  Right spring ligament reconstruction of the ankle  Right medial cuneiform cotton opening wedge osteotomy           Date of Procedure: 08/15/2024    Subjective: Patient reports that she is getting around okay in the walker boot.  She still using crutches and not bearing full weight in the walker boot just yet.  She notes she still having discomfort which is preventing her from bearing full weight in the walker boot.      Physical Examination: The incisional areas are not yet fully healed, the spring ligament incision still is scabbed however shows no signs of infection.  She is extremely swollen still to the affected extremity.  She has no other signs of DVT at this time.  She is able do a double leg toe raise effectively without pain with some effort.  She does have the presence of an arch in her foot.  She has palpable pulses and intact sensation of the foot.        Imaging:   Interpretation of imaging  Right foot XR: AP, Lateral, Oblique views     ICD-10-CM    1. Right ankle instability  M25.371 XR FOOT RIGHT (MIN 3 VIEWS)      2. Tibialis tendinitis of right lower extremity  M76.821 XR FOOT RIGHT (MIN 3 VIEWS)         Report: AP, lateral, oblique x-ray of the right foot demonstrates no hardware failures    Impression: No hardware failures   Danni Russell, APRN - CNP           Assessment:   Status post right flatfoot reconstruction with Broström procedure.      Plan:   3 This is stable chronic illness/condition  Treatment at this time: Patient may begin to wean from

## 2025-01-29 ENCOUNTER — OFFICE VISIT (OUTPATIENT)
Dept: ORTHOPEDIC SURGERY | Age: 65
End: 2025-01-29
Payer: COMMERCIAL

## 2025-01-29 DIAGNOSIS — M25.371 RIGHT ANKLE INSTABILITY: ICD-10-CM

## 2025-01-29 DIAGNOSIS — M76.821 TIBIALIS TENDINITIS OF RIGHT LOWER EXTREMITY: Primary | ICD-10-CM

## 2025-01-29 PROCEDURE — 99213 OFFICE O/P EST LOW 20 MIN: CPT | Performed by: NURSE PRACTITIONER

## 2025-01-29 NOTE — PROGRESS NOTES
Name: Mihaela Osman  YOB: 1960  Gender: female  MRN: [id number]    Procedure Performed:  Right ankle arthroscopy with debridement of lateral talar osteochondral defect with microfracture 4 x 4 mm  Right lateral ankle ligament reconstruction, Broström  Right gastrocnemius recession  Right medial displacement calcaneal osteotomy  Right posterior tibial tendon debridement with flexor digitorum longus tendon transfer  Right spring ligament reconstruction of the ankle  Right medial cuneiform cotton opening wedge osteotomy           Date of Procedure: 08/15/2024      Subjective: Patient reports that she is continuing to work hard to regain strength mobility.  She does still have some areas of tenderness and swelling based on specific activity she performs.  Her biggest issue is pushing off on her tiptoes.      Physical Examination: Patient is able to do a double leg toe raise effectively today without pain.  She has palpable pulses and intact sensation to foot.  There is still swelling noted especially to the outer hindfoot over the peroneal tendons.  There is some fullness in this area as well.  She does have the presence of an arch in her foot.  She wiggles all toes effectively without pain.  The ankle joint stable to talar tilt and anterior drawer testing.        Imaging:   Interpretation of imaging  Right foot XR: AP, Lateral, Oblique views     ICD-10-CM    1. Right ankle instability  M25.371 XR FOOT RIGHT (MIN 3 VIEWS)      2. Tibialis tendinitis of right lower extremity  M76.821 XR FOOT RIGHT (MIN 3 VIEWS)         Report: AP, lateral, oblique x-ray of the right foot demonstrates healed osteotomies without hardware failure    Impression: Healed osteotomies without hardware failure   Danni Russell, APRN - CNP           Assessment:   Status post right flatfoot reconstruction and ankle arthroscopy with OCD debridement microfracturing with Broström procedure.  Patient is aware that

## 2025-07-18 ENCOUNTER — TELEPHONE (OUTPATIENT)
Dept: ORTHOPEDIC SURGERY | Age: 65
End: 2025-07-18

## 2025-07-18 NOTE — TELEPHONE ENCOUNTER
----- Message from Valencia GINGER sent at 7/18/2025  3:10 PM EDT -----  Regarding: Specialty Message to Provider Ann Marie  Specialty Message to Provider    Relationship to Patient: Self     Patient Message: needs a new plaquard for her car handicapped, Ann Marie  --------------------------------------------------------------------------------------------------------------------------    Call Back Information: OK to leave message on voicemail  Preferred Call Back Number: Phone [phone]

## (undated) DEVICE — FOOT & ANKLE SOFT DR WOMACK: Brand: MEDLINE INDUSTRIES, INC.

## (undated) DEVICE — KIT INSTR W/ 2.4MM GUIDEPIN SUT PASS WIRE NO2 FIBERWIRE

## (undated) DEVICE — NEEDLE SPNL L3.5IN PNK HUB S STL REG WALL FIT STYL W/ QNCKE

## (undated) DEVICE — DRAPE C ARM W54XL84IN MINI FOR OEC 6800

## (undated) DEVICE — BANDAGE COMPR L5YDXW2IN FOAM CO FLX

## (undated) DEVICE — SUTURE FIBERWIRE SZ 2 W/ TAPERED NEEDLE BLUE L38IN NONABSORB BLU L26.5MM 1/2 CIRCLE AR7200

## (undated) DEVICE — DRESSING PETRO W3XL8IN OIL EMUL N ADH GZ KNIT IMPREG CELOS

## (undated) DEVICE — SOLUTION IRRIG 1000ML 0.9% SOD CHL USP POUR PLAS BTL

## (undated) DEVICE — SPLINT THMB W4XL30IN FBRGLS PD PRECUT LTWT DURABLE FAST SET

## (undated) DEVICE — NEEDLE HYPO 18GA L1.5IN PNK S STL HUB POLYPR SHLD REG BVL

## (undated) DEVICE — BANDAGE,ELASTIC,ESMARK,STERILE,4"X9',LF: Brand: MEDLINE

## (undated) DEVICE — PRECISION THIN (9.0 X 0.38 X 31.0MM)

## (undated) DEVICE — GOWN,SIRUS,NONRNF,SETINSLV,XL,20/CS: Brand: MEDLINE

## (undated) DEVICE — GLOVE SURG SZ 65 CRM LTX FREE POLYISOPRENE POLYMER BEAD ANTI

## (undated) DEVICE — TUBING PMP L16FT MAIN DISP FOR AR-6400 AR-6475 Â€“ ORDER MULTIPLES OF 10 EACH

## (undated) DEVICE — Device

## (undated) DEVICE — BANDAGE GZ W2XL75IN ST RAYON POLY CNFRM STRTCH LTWT

## (undated) DEVICE — GLOVE SURG SZ 65 L12IN FNGR THK79MIL GRN LTX FREE

## (undated) DEVICE — DISSECTOR ENDOSCP L7MM DIA3MM FOR RESECT SM JT COOLCUT

## (undated) DEVICE — SYRINGE MED 30ML STD CLR PLAS LUERLOCK TIP N CTRL DISP

## (undated) DEVICE — FOOT DR TOLLISON & WOMACK: Brand: MEDLINE INDUSTRIES, INC.

## (undated) DEVICE — GOWN,ECLIPSE,POLYRNF,BRTHSLV,XL,30/CS: Brand: MEDLINE

## (undated) DEVICE — ZIMMER® STERILE DISPOSABLE TOURNIQUET CUFF WITH PLC, DUAL PORT, SINGLE BLADDER, 18 IN. (46 CM)

## (undated) DEVICE — SUTURE VICRYL SZ 2-0 L27IN ABSRB UD L26MM CT-2 1/2 CIR J269H

## (undated) DEVICE — SOLUTION IRRIG 3000ML 0.9% SOD CHL USP UROMATIC PLAS CONT

## (undated) DEVICE — BNDG,ELSTC,MATRIX,STRL,3"X5YD,LF,HOOK&LP: Brand: MEDLINE

## (undated) DEVICE — PADDING CAST W2INXL4YD ST COT COHESIVE HND TEARABLE SPEC

## (undated) DEVICE — GLOVE SURG SZ 8 L12IN FNGR THK79MIL GRN LTX FREE